# Patient Record
Sex: FEMALE
[De-identification: names, ages, dates, MRNs, and addresses within clinical notes are randomized per-mention and may not be internally consistent; named-entity substitution may affect disease eponyms.]

---

## 2021-03-22 PROBLEM — Z00.00 ENCOUNTER FOR PREVENTIVE HEALTH EXAMINATION: Status: ACTIVE | Noted: 2021-03-22

## 2021-03-23 ENCOUNTER — APPOINTMENT (OUTPATIENT)
Dept: NEUROSURGERY | Facility: CLINIC | Age: 21
End: 2021-03-23

## 2021-03-23 DIAGNOSIS — Z86.79 PERSONAL HISTORY OF OTHER DISEASES OF THE CIRCULATORY SYSTEM: ICD-10-CM

## 2021-04-07 PROBLEM — Z86.79 HISTORY OF INTRACRANIAL ANEURYSM: Status: RESOLVED | Noted: 2021-04-07 | Resolved: 2021-04-07

## 2021-04-07 NOTE — PHYSICAL EXAM
[General Appearance - Alert] : alert [General Appearance - Well Nourished] : well nourished [General Appearance - Well-Appearing] : healthy appearing [Oriented To Time, Place, And Person] : oriented to person, place, and time [Affect] : the affect was normal [Cranial Nerves Optic (II)] : visual acuity intact bilaterally,  pupils equal round and reactive to light [Cranial Nerves Oculomotor (III)] : extraocular motion intact [Cranial Nerves Trigeminal (V)] : facial sensation intact symmetrically [Cranial Nerves Facial (VII)] : face symmetrical [Cranial Nerves Vestibulocochlear (VIII)] : hearing was intact bilaterally [Cranial Nerves Glossopharyngeal (IX)] : tongue and palate midline [Cranial Nerves Accessory (XI - Cranial And Spinal)] : head turning and shoulder shrug symmetric [Cranial Nerves Hypoglossal (XII)] : there was no tongue deviation with protrusion [Motor Tone] : muscle tone was normal in all four extremities [Motor Strength] : muscle strength was normal in all four extremities [Sensation Tactile Decrease] : light touch was intact [Abnormal Walk] : normal gait [Intact] : all sensory within normal limits bilaterally [Sclera] : the sclera and conjunctiva were normal [PERRL With Normal Accommodation] : pupils were equal in size, round, reactive to light, with normal accommodation [Extraocular Movements] : extraocular movements were intact [Full Visual Field] : full visual field

## 2021-04-13 ENCOUNTER — APPOINTMENT (OUTPATIENT)
Dept: NEUROSURGERY | Facility: CLINIC | Age: 21
End: 2021-04-13

## 2021-06-01 NOTE — HISTORY OF PRESENT ILLNESS
[de-identified] : 20 year old female with history of known cerebral aneurysm diagnosed and followed at [facility] presents today follow up. Ms. Quinones has brought imaging and records from [facility].

## 2021-06-01 NOTE — ASSESSMENT
[FreeTextEntry1] : 20 year old female with history of known cerebral aneurysm diagnosed and followed at [facility] presents today follow up. Ms. Quinones has brought imaging and records from [facility].

## 2021-06-01 NOTE — ASSESSMENT
[FreeTextEntry1] : 20 year old female with history of known cerebral aneurysm diagnosed and followed at [facility] presents today follow up. Ms. Quinones has brought imaging and records from [facility].

## 2021-06-01 NOTE — HISTORY OF PRESENT ILLNESS
[de-identified] : 20 year old female with history of known cerebral aneurysm diagnosed and followed at [facility] presents today follow up. Ms. Quinones has brought imaging and records from [facility].

## 2021-07-20 ENCOUNTER — APPOINTMENT (OUTPATIENT)
Dept: NEUROSURGERY | Facility: CLINIC | Age: 21
End: 2021-07-20